# Patient Record
Sex: MALE | Race: BLACK OR AFRICAN AMERICAN | NOT HISPANIC OR LATINO | Employment: FULL TIME | URBAN - METROPOLITAN AREA
[De-identification: names, ages, dates, MRNs, and addresses within clinical notes are randomized per-mention and may not be internally consistent; named-entity substitution may affect disease eponyms.]

---

## 2015-06-19 LAB — HIV 1+O+2 AB SERPL QL: NORMAL

## 2018-08-02 ENCOUNTER — TELEPHONE (OUTPATIENT)
Dept: ENDOSCOPY | Facility: HOSPITAL | Age: 53
End: 2018-08-02

## 2018-08-03 DIAGNOSIS — Z12.11 SPECIAL SCREENING FOR MALIGNANT NEOPLASMS, COLON: Primary | ICD-10-CM

## 2018-08-08 DIAGNOSIS — Z12.11 SPECIAL SCREENING FOR MALIGNANT NEOPLASMS, COLON: Primary | ICD-10-CM

## 2018-08-16 ENCOUNTER — OFFICE VISIT (OUTPATIENT)
Dept: PODIATRY | Facility: CLINIC | Age: 53
End: 2018-08-16
Payer: MEDICARE

## 2018-08-16 VITALS
HEIGHT: 71 IN | HEART RATE: 66 BPM | BODY MASS INDEX: 21 KG/M2 | DIASTOLIC BLOOD PRESSURE: 87 MMHG | WEIGHT: 150 LBS | SYSTOLIC BLOOD PRESSURE: 147 MMHG

## 2018-08-16 DIAGNOSIS — L84 CORN OR CALLUS: ICD-10-CM

## 2018-08-16 DIAGNOSIS — B35.1 ONYCHOMYCOSIS DUE TO DERMATOPHYTE: ICD-10-CM

## 2018-08-16 DIAGNOSIS — E11.49 TYPE II DIABETES MELLITUS WITH NEUROLOGICAL MANIFESTATIONS: Primary | ICD-10-CM

## 2018-08-16 DIAGNOSIS — M20.42 HAMMER TOES OF BOTH FEET: ICD-10-CM

## 2018-08-16 DIAGNOSIS — M20.41 HAMMER TOES OF BOTH FEET: ICD-10-CM

## 2018-08-16 PROCEDURE — 99203 OFFICE O/P NEW LOW 30 MIN: CPT | Mod: 25,S$PBB,, | Performed by: PODIATRIST

## 2018-08-16 PROCEDURE — 99213 OFFICE O/P EST LOW 20 MIN: CPT | Mod: PBBFAC | Performed by: PODIATRIST

## 2018-08-16 PROCEDURE — 99999 PR PBB SHADOW E&M-EST. PATIENT-LVL III: CPT | Mod: PBBFAC,,, | Performed by: PODIATRIST

## 2018-08-16 PROCEDURE — 11721 DEBRIDE NAIL 6 OR MORE: CPT | Mod: 59,Q9,S$PBB, | Performed by: PODIATRIST

## 2018-08-16 PROCEDURE — 11721 DEBRIDE NAIL 6 OR MORE: CPT | Mod: Q9,PBBFAC | Performed by: PODIATRIST

## 2018-08-16 PROCEDURE — 11057 PARNG/CUTG B9 HYPRKR LES >4: CPT | Mod: Q9,59,PBBFAC | Performed by: PODIATRIST

## 2018-08-16 PROCEDURE — 11057 PARNG/CUTG B9 HYPRKR LES >4: CPT | Mod: Q9,S$PBB,, | Performed by: PODIATRIST

## 2018-08-16 RX ORDER — CICLOPIROX 80 MG/ML
SOLUTION TOPICAL NIGHTLY
Qty: 6.6 ML | Refills: 11 | Status: SHIPPED | OUTPATIENT
Start: 2018-08-16

## 2018-08-16 RX ORDER — AMMONIUM LACTATE 12 G/100G
CREAM TOPICAL
Qty: 140 G | Refills: 11 | Status: SHIPPED | OUTPATIENT
Start: 2018-08-16

## 2018-08-16 NOTE — PROGRESS NOTES
Subjective:      Patient ID: Salty Goode is a 53 y.o. male.    Chief Complaint: Diabetes Mellitus (dr cait shannon /07/05/2018) and Diabetic Foot Exam    Diabetes, increased risk amputation needing evaluation/management/optomization of foot care.  CC thick hard discolored callus and toenails both feet.  Gradual onset, worsening over past several weeks, aggravated by increased weight bearing, shoe gear, pressure.  No previous medical treatment.  OTC pain med not helping. Denies trauma, surgeyr both feet.  Wears tennis shoes both feet.    Chief Complaint   Patient presents with    Diabetes Mellitus     dr cait shannon /07/05/2018    Diabetic Foot Exam        Review of Systems   Constitution: Negative for chills, diaphoresis, fever, malaise/fatigue and night sweats.   Cardiovascular: Negative for claudication, cyanosis, leg swelling and syncope.   Skin: Positive for nail changes and suspicious lesions. Negative for color change, dry skin, rash and unusual hair distribution.   Musculoskeletal: Negative for falls, joint pain, joint swelling, muscle cramps, muscle weakness and stiffness.   Gastrointestinal: Negative for constipation, diarrhea, nausea and vomiting.   Neurological: Positive for paresthesias and sensory change. Negative for brief paralysis, disturbances in coordination, focal weakness, numbness and tremors.           Objective:      Physical Exam   Constitutional: He is oriented to person, place, and time. He appears well-developed and well-nourished. He is cooperative. No distress.   Cardiovascular:   Pulses:       Popliteal pulses are 2+ on the right side, and 2+ on the left side.        Dorsalis pedis pulses are 2+ on the right side, and 2+ on the left side.        Posterior tibial pulses are 2+ on the right side, and 2+ on the left side.   Capillary refill 3 seconds all toes/distal feet, all toes/both feet warm to touch.      Negative lymphadenopathy bilateral popliteal fossa and tarsal  tunnel.      Negavie lower extremity edema bilateral.     Musculoskeletal:        Right ankle: He exhibits normal range of motion, no swelling, no ecchymosis, no deformity, no laceration and normal pulse. Achilles tendon normal. Achilles tendon exhibits no pain, no defect and normal Villar's test results.   Patient has hammertoes of digits  2-5 bilateral                 partially reducible without symptom today.    Otherwise, Normal angle, base, station of gait. All ten toes without clubbing, cyanosis, or signs of ischemia.  No pain to palpation bilateral lower extremities.  Range of motion, stability, muscle strength, and muscle tone normal bilateral feet and legs.     Lymphadenopathy: No inguinal adenopathy noted on the right or left side.   Negative lymphadenopathy bilateral popliteal fossa and tarsal tunnel.    Negative lymphangitic streaking bilateral feet/ankles/legs.   Neurological: He is alert and oriented to person, place, and time. He has normal strength. He displays no atrophy and no tremor. A sensory deficit is present. He exhibits normal muscle tone. Gait normal.   Reflex Scores:       Patellar reflexes are 2+ on the right side and 2+ on the left side.       Achilles reflexes are 2+ on the right side and 2+ on the left side.  Negative tinel sign to percussion sural, superficial peroneal, deep peroneal, saphenous, and posterior tibial nerves right and left ankles and feet.    Diminished/loss of protective sensation all toes bilateral to 10 gram monofilament.      Paresthesias, and burning bilateral feet with no clearly identified trigger or source.     Skin: Skin is warm, dry and intact. Capillary refill takes 2 to 3 seconds. No abrasion, no bruising, no burn, no ecchymosis, no laceration, no lesion and no rash noted. He is not diaphoretic. No cyanosis or erythema. No pallor. Nails show no clubbing.   Focal hyperkeratotic lesion consisting entirely of hyperkeratotic tissue without open skin, drainage,  pus, fluctuance, malodor, or signs of infection plantar central heel bilateral, plantar mtpj 1 left, plantar mtpj 2,5 right (total 5)    Otherwise, Skin is normal age and health appropriate color, turgor, texture, and temperature bilateral lower extremities without ulceration, hyperpigmentation, discoloration, masses nodules or cords palpated.  No ecchymosis, erythema, edema, or cardinal signs of infection bilateral lower extremities.    Toenails 1st, 2nd, 3rd, 4th, 5th  bilateral are hypertrophic thickened 2-3 mm, dystrophic, discolored tanish brown with tan, gray crumbly subungual debris.  Tender to distal nail plate pressure, without periungual skin abnormality of each.     Psychiatric: He has a normal mood and affect.             Assessment:       Encounter Diagnoses   Name Primary?    Type II diabetes mellitus with neurological manifestations Yes    Corn or callus     Onychomycosis due to dermatophyte     Hammer toes of both feet          Plan:       Salty was seen today for diabetes mellitus and diabetic foot exam.    Diagnoses and all orders for this visit:    Type II diabetes mellitus with neurological manifestations  -     DIABETIC SHOES FOR HOME USE    Corn or callus  -     DIABETIC SHOES FOR HOME USE    Onychomycosis due to dermatophyte  -     DIABETIC SHOES FOR HOME USE    Hammer toes of both feet    Other orders  -     ciclopirox (PENLAC) 8 % Soln; Apply topically nightly.  -     ammonium lactate 12 % Crea; Apply twice daily to affected parts both feet as needed.      I counseled the patient on his conditions, their implications and medical management.        - Shoe inspection. Diabetic Foot Education. Patient reminded of the importance of good nutrition and blood sugar control to help prevent podiatric complications of diabetes. Patient instructed on proper foot hygeine. We discussed wearing proper shoe gear, daily foot inspections, never walking without protective shoe gear, never putting sharp  instruments to feet, routine podiatric visits at least annually months.      - With patient's permission, nails were aggressively reduced and debrided x 10 to their soft tissue attachment mechanically and with electric , removing all offending nail and debris. Patient relates relief following the procedure. He will continue to monitor the areas daily, inspect his feet, wear protective shoe gear when ambulatory, moisturizer to maintain skin integrity and follow in this office  P.r.n.    With the patient's permission, I debrided hyperkeratotic lesion(s) as above totaling       5         to, not  Including dermis with sterile #15 blade.  Patient tolerated the procedure well and related significant relief.        Rx DM shoes, inserts, lac hydrin, penlac    Discussed conservative treatment with shoes of adequate dimensions, material, and style to alleviate symptoms and delay or prevent surgical intervention.           Follow-up in about 1 year (around 8/16/2019).

## 2018-08-16 NOTE — LETTER
August 16, 2018      Francisco Bradley MD  [address] [facility] [place] [address]           [facility]  [address]  [place] [address]  Phone: [phone]          Patient: Salty Goode   MR Number: [id number]   YOB: 1965   Date of Visit: 8/16/2018       Dear Dr. Francisco Bradley:    Thank you for referring Salty Goode to me for evaluation. Attached you will find relevant portions of my assessment and plan of care.    If you have questions, please do not hesitate to call me. I look forward to following Salty Goode along with you.    Sincerely,    Naveed Doe, JAXON    Enclosure  CC:  No Recipients    If you would like to receive this communication electronically, please contact [email or web address] or [phone] to request more information on [employer] Link access.    For providers and/or their staff who would like to refer a patient to [facility], please contact us through our one-stop-shop provider referral line, [facility], at [phone].    If you feel you have received this communication in error or would no longer like to receive these types of communications, please e-mail [email or web address]

## 2018-08-21 DIAGNOSIS — Z12.11 SPECIAL SCREENING FOR MALIGNANT NEOPLASMS, COLON: Primary | ICD-10-CM

## 2018-08-21 RX ORDER — POLYETHYLENE GLYCOL 3350, SODIUM SULFATE ANHYDROUS, SODIUM BICARBONATE, SODIUM CHLORIDE, POTASSIUM CHLORIDE 236; 22.74; 6.74; 5.86; 2.97 G/4L; G/4L; G/4L; G/4L; G/4L
4 POWDER, FOR SOLUTION ORAL ONCE
Qty: 4000 ML | Refills: 0 | Status: SHIPPED | OUTPATIENT
Start: 2018-08-21 | End: 2018-08-21

## 2018-08-29 ENCOUNTER — TELEPHONE (OUTPATIENT)
Dept: ENDOSCOPY | Facility: HOSPITAL | Age: 53
End: 2018-08-29

## 2018-08-29 DIAGNOSIS — Z12.11 SCREEN FOR COLON CANCER: Primary | ICD-10-CM

## 2018-11-06 ENCOUNTER — TELEPHONE (OUTPATIENT)
Dept: ENDOSCOPY | Facility: HOSPITAL | Age: 53
End: 2018-11-06

## 2018-11-12 ENCOUNTER — TELEPHONE (OUTPATIENT)
Dept: ENDOSCOPY | Facility: HOSPITAL | Age: 53
End: 2018-11-12

## 2019-01-09 DIAGNOSIS — I69.354 HEMIPLEGIA AND HEMIPARESIS FOLLOWING CEREBRAL INFARCTION AFFECTING LEFT NON-DOMINANT SIDE: Primary | ICD-10-CM

## 2019-01-18 ENCOUNTER — TELEPHONE (OUTPATIENT)
Dept: PODIATRY | Facility: CLINIC | Age: 54
End: 2019-01-18

## 2025-03-12 LAB
CREAT UR-MCNC: 46.9 MG/DL
MICROALBUMIN URINE: 58.3
MICROALBUMIN/CREATININE RATIO: 124 UG/MG

## 2025-03-26 ENCOUNTER — TELEPHONE (OUTPATIENT)
Dept: NEUROLOGY | Facility: CLINIC | Age: 60
End: 2025-03-26
Payer: MEDICARE

## 2025-03-26 NOTE — TELEPHONE ENCOUNTER
----- Message from Mak sent at 3/25/2025  9:42 AM CDT -----  Regarding: Callback  Contact: [phone]  Patient calling requesting a callback from nurse or provider in regards to speaking about medications. Please call back as soon as possible.

## 2025-04-11 ENCOUNTER — TELEPHONE (OUTPATIENT)
Dept: NEUROLOGY | Facility: CLINIC | Age: 60
End: 2025-04-11
Payer: MEDICARE

## 2025-04-11 DIAGNOSIS — G89.29 OTHER CHRONIC PAIN: Primary | ICD-10-CM

## 2025-04-11 RX ORDER — TRAMADOL HYDROCHLORIDE 50 MG/1
100 TABLET ORAL EVERY 6 HOURS
COMMUNITY
End: 2025-04-11 | Stop reason: SDUPTHER

## 2025-04-11 RX ORDER — DIAZEPAM 5 MG/1
5 TABLET ORAL 3 TIMES DAILY PRN
COMMUNITY
End: 2025-04-11 | Stop reason: SDUPTHER

## 2025-04-11 NOTE — TELEPHONE ENCOUNTER
----- Message from Teressa sent at 4/11/2025 12:51 PM CDT -----  Regarding: missed call  Type:  Patient Returning CallWho Called:patientWho Left Message for Patient:Maday the patient know what this is regarding?:yesWould the patient rather a call back or a response via [facility]? [facility] Call Back Number: [phone] Information:

## 2025-04-14 ENCOUNTER — TELEPHONE (OUTPATIENT)
Dept: NEUROLOGY | Facility: CLINIC | Age: 60
End: 2025-04-14
Payer: MEDICARE

## 2025-04-14 RX ORDER — DIAZEPAM 5 MG/1
5 TABLET ORAL 3 TIMES DAILY PRN
Qty: 90 TABLET | Refills: 1 | Status: SHIPPED | OUTPATIENT
Start: 2025-04-14

## 2025-04-14 RX ORDER — TRAMADOL HYDROCHLORIDE 50 MG/1
100 TABLET ORAL EVERY 6 HOURS PRN
Qty: 240 TABLET | Refills: 1 | Status: SHIPPED | OUTPATIENT
Start: 2025-04-14

## 2025-04-14 NOTE — TELEPHONE ENCOUNTER
----- Message from Khadijah sent at 4/14/2025  8:26 AM CDT -----    Patient Returning CallWho Called pt Does the patient know what this is regarding?:need all medications filled pt need his medicationWould the patient rather a call back or a response via [facility]? [facility] Call Back Number:[phone] Information: call back has soonest appt that's in June

## 2025-04-16 ENCOUNTER — OFFICE VISIT (OUTPATIENT)
Dept: PRIMARY CARE CLINIC | Facility: CLINIC | Age: 60
End: 2025-04-16
Payer: MEDICARE

## 2025-04-16 VITALS
HEART RATE: 84 BPM | HEIGHT: 71 IN | DIASTOLIC BLOOD PRESSURE: 64 MMHG | SYSTOLIC BLOOD PRESSURE: 112 MMHG | OXYGEN SATURATION: 97 % | WEIGHT: 165.13 LBS | BODY MASS INDEX: 23.12 KG/M2

## 2025-04-16 DIAGNOSIS — G81.94 LEFT HEMIPARESIS: ICD-10-CM

## 2025-04-16 DIAGNOSIS — E11.9 TYPE 2 DIABETES MELLITUS WITHOUT COMPLICATION, WITHOUT LONG-TERM CURRENT USE OF INSULIN: ICD-10-CM

## 2025-04-16 DIAGNOSIS — E78.00 HYPERCHOLESTEROLEMIA: Primary | ICD-10-CM

## 2025-04-16 DIAGNOSIS — Z00.00 ROUTINE MEDICAL EXAM: ICD-10-CM

## 2025-04-16 PROCEDURE — 99999 PR PBB SHADOW E&M-EST. PATIENT-LVL IV: CPT | Mod: PBBFAC,,, | Performed by: NURSE PRACTITIONER

## 2025-04-16 RX ORDER — LOSARTAN POTASSIUM AND HYDROCHLOROTHIAZIDE 25; 100 MG/1; MG/1
1 TABLET ORAL DAILY
COMMUNITY
Start: 2025-03-12

## 2025-04-16 RX ORDER — CHOLECALCIFEROL (VITAMIN D3) 25 MCG
1 TABLET ORAL DAILY
COMMUNITY
Start: 2025-03-12

## 2025-04-16 RX ORDER — DULAGLUTIDE 1.5 MG/.5ML
1.5 INJECTION, SOLUTION SUBCUTANEOUS
COMMUNITY
Start: 2025-03-18

## 2025-04-16 NOTE — PROGRESS NOTES
[facility] Note    Chief Complaint      Chief Complaint   Patient presents with    Establish Care    Referral     Neurology    diabetic shoes     order       History of Present Illness    Patient is new to me.  He presents to clinic today to establish primary care with me.  Reports feeling well today.  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  He is being followed by Neurology.  There are no complaints today.    Salty Goode is a 59 y.o. male who presents today for   Chief Complaint   Patient presents with    [facility]    Referral     Neurology    diabetic shoes     order     Vitals: Normal blood pressure.  General: No acute distress. Well-developed. Well-nourished.  Eyes: EOMI. Sclerae anicteric.  HENT: Normocephalic. Atraumatic. Nares patent. Moist oral mucosa.  Cardiovascular: Regular rate. Regular rhythm. No murmurs. No rubs. No gallops. Normal S1, S2. Normal heart sounds.  Respiratory: Normal respiratory effort. Clear to auscultation bilaterally. No rales. No rhonchi. No wheezing. Normal lung sounds.  Musculoskeletal: No  obvious deformity.  Extremities: No lower extremity edema.  Neurological: Alert & oriented x3. No slurred speech. Normal gait.  Psychiatric: Normal mood. Normal affect. Good insight. Good judgment.  Skin: Warm. Dry. No rash.      Family History:  family history is not on file.   Family history was reviewed with patient.     Medications:  Encounter Medications[1]    Allergies:  Review of patient's allergies indicates:  No Known Allergies    Health Maintenance:  Health Maintenance   Topic Date Due    Hepatitis C Screening  Never done    HIV Screening  Never done    Shingles Vaccine (1 of 2) Never done    TETANUS VACCINE  04/16/2026 (Originally 8/5/1984)    High Dose Statin  04/16/2026    Colorectal Cancer Screening  08/29/2028    Lipid Panel  01/30/2029    RSV Vaccine (Age 60+ and Pregnant patients) (1  "- 1-dose 75+ series) 08/05/2041    Influenza Vaccine  Completed    COVID-19 Vaccine  Completed    Pneumococcal Vaccines (Age 50+)  Completed     Health Maintenance Topics with due status: Not Due       Topic Last Completion Date    Colorectal Cancer Screening 08/29/2018    Lipid Panel 01/30/2024    High Dose Statin 04/16/2025    RSV Vaccine (Age 60+ and Pregnant patients) Not Due       Physical Exam      Vital Signs  Pulse: 84  SpO2: 97 %  BP: 112/64  BP Location: Left arm  Patient Position: Sitting  Pain Score:   9  Height and Weight  Height: 5' 10.8" (179.8 cm)  Weight: 74.9 kg (165 lb 2 oz)  BSA (Calculated - sq m): 1.93 sq meters  BMI (Calculated): 23.2  Weight in (lb) to have BMI = 25: 177.9]    Physical Exam  Vitals reviewed.   Constitutional:       Appearance: Normal appearance. He is normal weight.   HENT:      Head: Normocephalic and atraumatic.      Right Ear: Tympanic membrane, ear canal and external ear normal.      Left Ear: Tympanic membrane, ear canal and external ear normal.      Nose: Nose normal.      Mouth/Throat:      Mouth: Mucous membranes are moist.      Pharynx: Oropharynx is clear.   Eyes:      Extraocular Movements: Extraocular movements intact.      Conjunctiva/sclera: Conjunctivae normal.      Pupils: Pupils are equal, round, and reactive to light.   Cardiovascular:      Rate and Rhythm: Normal rate and regular rhythm.      Pulses: Normal pulses.      Heart sounds: Normal heart sounds.   Pulmonary:      Effort: Pulmonary effort is normal.      Breath sounds: Normal breath sounds.   Abdominal:      General: Abdomen is flat. Bowel sounds are normal.      Palpations: Abdomen is soft.   Musculoskeletal:         General: Normal range of motion.      Cervical back: Normal range of motion and neck supple.      Comments: + unable to move LUE freely.   Skin:     General: Skin is warm and dry.      Capillary Refill: Capillary refill takes less than 2 seconds.   Neurological:      Mental Status: He " is alert and oriented to person, place, and time. Mental status is at baseline.      Gait: Gait abnormal.   Psychiatric:         Mood and Affect: Mood normal.         Behavior: Behavior normal.         Thought Content: Thought content normal.         Judgment: Judgment normal.            Assessment/Plan     Salty Goode is a 59 y.o.male with:    ROUTINE MEDICAL EXAM:  - CBC, CMP, Hgb A1c, thyroid levels reviewed with patient.  - physical assessment completed.    Hypercholesterolemia  -     Lipid Panel; Future; Expected date: 04/16/2025    Left hemiparesis  - patient is being followed by neurology.    Type 2 diabetes mellitus without complication, without long-term current use of insulin  -     Ambulatory referral/consult to Podiatry; Future; Expected date: 04/23/2025  - Referring patient to GLORIA Esparza for diabetes management.        As above, continue current medications and maintain follow up with specialists.  Return to clinic as needed.    Greater than 50% of visit was spent face to face with patient.  All questions were answered to patient's satisfaction.          Karen L Spencer, NP-C [facility]                     [1]   Outpatient Encounter Medications as of 4/16/2025   Medication Sig Dispense Refill    amlodipine (NORVASC) 10 MG tablet Take 1 tablet (10 mg total) by mouth once daily. 30 tablet 4    aspirin (ECOTRIN) 325 MG EC tablet Take 1 tablet (325 mg total) by mouth once daily.  0    atorvastatin (LIPITOR) 40 MG tablet Take 1 tablet (40 mg total) by mouth once daily. 30 tablet 4    clopidogrel (PLAVIX) 75 mg tablet Take 1 tablet (75 mg total) by mouth once daily. 30 tablet 4    diazePAM (VALIUM) 5 MG tablet Take 1 tablet (5 mg total) by mouth 3 (three) times daily as needed for Anxiety. 90 tablet 1    empagliflozin (JARDIANCE) 25 mg tablet Take 1 tablet by mouth once daily.      losartan-hydrochlorothiazide 100-25 mg (HYZAAR) 100-25 mg per tablet Take 1 tablet by mouth once daily.       traMADoL (ULTRAM) 50 mg tablet Take 2 tablets (100 mg total) by mouth every 6 (six) hours as needed for Pain. 240 tablet 1    TRULICITY 1.5 mg/0.5 mL pen injector Inject 1.5 mg into the skin every 7 days.      vitamin D (VITAMIN D3) 1000 units Tab Take 1 tablet by mouth once daily.      [DISCONTINUED] ammonium lactate 12 % Crea Apply twice daily to affected parts both feet as needed. (Patient not taking: Reported on 4/16/2025) 140 g 11    [DISCONTINUED] ciclopirox (PENLAC) 8 % Soln Apply topically nightly. (Patient not taking: Reported on 4/16/2025) 6.6 mL 11    [DISCONTINUED] diazePAM (VALIUM) 5 MG tablet Take 5 mg by mouth 3 (three) times daily as needed for Anxiety.      [DISCONTINUED] lisinopril (PRINIVIL,ZESTRIL) 40 MG tablet Take 1 tablet (40 mg total) by mouth once daily. (Patient not taking: Reported on 4/16/2025) 30 tablet 4    [DISCONTINUED] metformin (GLUCOPHAGE) 500 MG tablet Take 500 mg by mouth 2 (two) times daily with meals. (Patient not taking: Reported on 4/16/2025)      [DISCONTINUED] traMADoL (ULTRAM) 50 mg tablet Take 100 mg by mouth every 6 (six) hours.       No facility-administered encounter medications on file as of 4/16/2025.

## 2025-04-17 ENCOUNTER — TELEPHONE (OUTPATIENT)
Dept: INTERNAL MEDICINE | Facility: CLINIC | Age: 60
End: 2025-04-17
Payer: MEDICARE

## 2025-04-17 NOTE — TELEPHONE ENCOUNTER
Attempted to call patient for scheduling with diabetic provider. Patient didn't answer, left message to call office back for scheduling.

## 2025-04-17 NOTE — TELEPHONE ENCOUNTER
Patient called for scheduling. Patient scheduled soonest available, patient voiced understanding of appointment time and date.

## 2025-04-21 ENCOUNTER — LAB VISIT (OUTPATIENT)
Dept: LAB | Facility: HOSPITAL | Age: 60
End: 2025-04-21
Attending: NURSE PRACTITIONER
Payer: MEDICARE

## 2025-04-21 DIAGNOSIS — E78.00 HYPERCHOLESTEROLEMIA: ICD-10-CM

## 2025-04-21 LAB
CHOLEST SERPL-MCNC: 148 MG/DL (ref 120–199)
CHOLEST/HDLC SERPL: 5.3 {RATIO} (ref 2–5)
HDLC SERPL-MCNC: 28 MG/DL (ref 40–75)
HDLC SERPL: 18.9 % (ref 20–50)
LDLC SERPL CALC-MCNC: 105.8 MG/DL (ref 63–159)
NONHDLC SERPL-MCNC: 120 MG/DL
TRIGL SERPL-MCNC: 71 MG/DL (ref 30–150)

## 2025-04-21 PROCEDURE — 80061 LIPID PANEL: CPT

## 2025-04-21 PROCEDURE — 36415 COLL VENOUS BLD VENIPUNCTURE: CPT | Mod: PN

## 2025-04-23 DIAGNOSIS — E11.9 TYPE 2 DIABETES MELLITUS WITHOUT COMPLICATION, WITHOUT LONG-TERM CURRENT USE OF INSULIN: Primary | ICD-10-CM

## 2025-04-23 NOTE — PROGRESS NOTES
Review cholesterol level with patient via telephone.  Patient needs diabetic shoes and can not see podiatrist for another 3 months.  I have placed order for diabetic shoes.  Patient will continue follow up with Podiatry in July.  He will  prescription from our office.

## 2025-05-09 LAB — CRC RECOMMENDATION EXT: NORMAL

## 2025-05-13 ENCOUNTER — OFFICE VISIT (OUTPATIENT)
Dept: INTERNAL MEDICINE | Facility: CLINIC | Age: 60
End: 2025-05-13
Payer: MEDICARE

## 2025-05-13 VITALS
WEIGHT: 164.25 LBS | SYSTOLIC BLOOD PRESSURE: 120 MMHG | BODY MASS INDEX: 23.04 KG/M2 | DIASTOLIC BLOOD PRESSURE: 64 MMHG | OXYGEN SATURATION: 98 % | HEART RATE: 86 BPM

## 2025-05-13 DIAGNOSIS — E11.9 TYPE 2 DIABETES MELLITUS WITHOUT COMPLICATION, WITHOUT LONG-TERM CURRENT USE OF INSULIN: Primary | ICD-10-CM

## 2025-05-13 DIAGNOSIS — I63.9 ACUTE CVA (CEREBROVASCULAR ACCIDENT): ICD-10-CM

## 2025-05-13 DIAGNOSIS — E78.5 HYPERLIPIDEMIA LDL GOAL <70: ICD-10-CM

## 2025-05-13 DIAGNOSIS — I10 BENIGN HYPERTENSION: ICD-10-CM

## 2025-05-13 LAB — GLUCOSE SERPL-MCNC: 160 MG/DL (ref 70–110)

## 2025-05-13 PROCEDURE — 99999 PR PBB SHADOW E&M-EST. PATIENT-LVL III: CPT | Mod: PBBFAC,,,

## 2025-05-13 RX ORDER — INSULIN ASPART 100 [IU]/ML
INJECTION, SOLUTION INTRAVENOUS; SUBCUTANEOUS
Qty: 15 ML | Refills: 3 | Status: SHIPPED | OUTPATIENT
Start: 2025-05-13

## 2025-05-13 RX ORDER — AMLODIPINE BESYLATE 10 MG/1
10 TABLET ORAL DAILY
Qty: 30 TABLET | Refills: 4 | Status: SHIPPED | OUTPATIENT
Start: 2025-05-13 | End: 2025-05-13

## 2025-05-13 RX ORDER — AMLODIPINE BESYLATE 10 MG/1
10 TABLET ORAL DAILY
Qty: 30 TABLET | Refills: 4 | Status: SHIPPED | OUTPATIENT
Start: 2025-05-13 | End: 2026-05-13

## 2025-05-13 RX ORDER — CLOPIDOGREL BISULFATE 75 MG/1
75 TABLET ORAL DAILY
Qty: 30 TABLET | Refills: 4 | Status: SHIPPED | OUTPATIENT
Start: 2025-05-13 | End: 2025-05-13

## 2025-05-13 RX ORDER — ATORVASTATIN CALCIUM 40 MG/1
40 TABLET, FILM COATED ORAL DAILY
Qty: 30 TABLET | Refills: 4 | Status: SHIPPED | OUTPATIENT
Start: 2025-05-13 | End: 2025-05-13

## 2025-05-13 RX ORDER — BACLOFEN 10 MG/1
30 TABLET ORAL 3 TIMES DAILY
COMMUNITY
Start: 2025-01-02

## 2025-05-13 RX ORDER — FERROUS SULFATE 325(65) MG
TABLET ORAL
COMMUNITY

## 2025-05-13 RX ORDER — ATORVASTATIN CALCIUM 40 MG/1
40 TABLET, FILM COATED ORAL DAILY
Qty: 30 TABLET | Refills: 4 | Status: SHIPPED | OUTPATIENT
Start: 2025-05-13 | End: 2026-05-13

## 2025-05-13 RX ORDER — INSULIN ASPART 100 [IU]/ML
INJECTION, SOLUTION INTRAVENOUS; SUBCUTANEOUS
Qty: 3 ML | Refills: 3 | Status: SHIPPED | OUTPATIENT
Start: 2025-05-13 | End: 2025-05-13

## 2025-05-13 RX ORDER — CLOPIDOGREL BISULFATE 75 MG/1
75 TABLET ORAL DAILY
Qty: 30 TABLET | Refills: 4 | Status: SHIPPED | OUTPATIENT
Start: 2025-05-13 | End: 2026-05-13

## 2025-05-13 NOTE — ASSESSMENT & PLAN NOTE
-Reviewed Lipid panel 148/71/28/105  -Discussed LDL goal <70.  -Discussed lifestyle changes.  -Advised to start back taking his atorvastatin 40 mg at bedtime.  -Discussed importance of lipid control in setting of diabetes  -Discussed the importance of taking statin every night and lifestyle changes to slow the risk of a cardiovascular event such as CVA or MI.

## 2025-05-13 NOTE — ASSESSMENT & PLAN NOTE
-Currently not taking Plavix   -Discussed importance of taking medications as prescribed to prevent another cardiovascular incident.  -Refilled Plavix

## 2025-05-13 NOTE — ASSESSMENT & PLAN NOTE
-Reviewed /64  -Discussed BP goal 130/80 to prevent a cardiovascular event such as CVA or MI.  -currently not taking amlodipine, prescription .  -Discussed the importance of taking medications as prescribed to prevent another cardiovascular incident  -Advised to start back taking amlodipine 10 mg daily  -Advised to monitor BP at home.  -Amlodipine refilled.

## 2025-05-13 NOTE — ASSESSMENT & PLAN NOTE
-Reviewed last A1c.  8.4%  -Discussed A1c goal <7%.  -Discussed BG goals, provided copy.  -Start Dexcom G7 CGM monitoring.  -Dexcom prescription sent to [facility].  -Discussed importance of making lifestyle changes.  -Provided brochures / hand out on meal planning.  -Advised to start an exercise regimen.   -Cont.  Jardiance 25 mg daily.  -Increase Trulicity to 3 mg weekly  -Start NovoLog sliding scale insulin with meals    .0-150= 0  151-200 = 2 units  201-250= 4 units  251-300= 6 units  301-350= 8 units  351 or greater = 10 units.   -Discussed how to treat hypoglycemia if experienced.  -Discussed goal to wean insulin to a minimal dose w/o experiencing hypo or hyperglycemia.

## 2025-05-13 NOTE — PATIENT INSTRUCTIONS
Start monitoring your blood sugars when you get your Dexcom G7.       Blood glucose goals:   Fasting blood glucose goal:  mg/dL.  Premeal blood glucose goal:  mg/dL.  Postmeal blood glucose goal ( 2 hrs): <180 mg/dL.  Bedtime / overnight blood glucose goal:  mg/dL.     Continue taking Jardiance 25mg daily.      Increase Trulicity to 3mg weekly.      Start Novolog sliding scale with meals as needed.       Start taking your Plavix and Amlodipine in the morning.      Start taking your Atorvastatin every night.      If your blood sugar is low, follow the 15-15 rule: Have 15 grams of carbs, then wait 15 minutes. If the blood glucose level is less than 70, treat with 15 g of fast-acting carbohydrate, such as 4 oz of fruit juice or three or four glucose tablets; and  recheck the blood glucose level after 15 minutes to ensure that it has normalized.  Check your blood sugar again. If it's still less than 70 mg/dL, repeat this process.        Refer to the food brochures when planning meals.      Start exercising for 30 minutes at least 3 times / week as tolerates. Be sure to mix cardio with strength training.       Schedule your diabetes eye exam.      Follow up on  6/24/2025 @ 10:30 am.

## 2025-05-13 NOTE — PROGRESS NOTES
CHIEF COMPLAINT: Type 2 Diabetes    Referred by PCP: Olive Nieves  Previously managed by: PCP  CDE person of contact (if needed): Rima Nicole       HPI: Mr. Salty Goode is a 59 y.o. male who was diagnosed with Type 2 DM in +/- 20 yrs.     Last A1c: 8.4%    Hx of hospitalization for DM?  No  Hx of HTN?  Yes  Hx of CKD?  No  Hx of CHF?  No  Hx of CVA? Yes,   Hx of MI?  No  Hx of Pancreatitis?  No  Hx of DM neuropathy?  No  Hx DR?  No  Hx of Gastroparesis?  No      VISIT SUMMARY:  Has HX CVA  with left hemiparesis  Reports originally Dx with T2DM during childhood, but started treatment after his CVA 10 yrs ago.  Labs reviewed: A1c 8.4%; Lipid 148/71/28/105; GFR 87  Reports needing more diabetic shoes  Attends PT 3xs weekly  Reports not able to check his blood sugars due to limit ROM  to left hand.   POCT collected 160  Reports his Trulicity increased to 3mg, but local pharmacy is out of supply.  RX Trulicity 3mg sent to [facility]   Discussed starting sliding scale insulin      Sliding scale insulin:   0-150= 0  151-200 = 2 units  201-250= 4 units  251-300= 6 units  301-350= 8 units  351 or greater = 10 units.   Discussed starting Dexcom G7 CGM  RX for amlodipine, atorvastatin, plavix, .   Discussed with patient the importance of taking medications as prescribed, he verbalized understanding.  Refills sent to [facility].  Rx for Dexcom G7 kit sent to [facility]      BG monitoring:   Name: None  How often: N/A  BG range: N/A        Lifestyle:   Diet: 2-3 meals daily  Exercise: no regular exercise; attends PT 3xs week        Previous Diabetes Meds Tried:  Metformin- GI upset   Glimepiride- diabetes better controlled          Current Diabetic Meds:   Trulicity 1.5mg weekly  Jardiance 25mg daily          Diabetes Management Status:  Statin: Not taking  ACE/ARB: Taking    Foot exam due:  Eye exam due:      Screening or Prevention Patient's value Goal Complete/Controlled?   HgA1C  "Testing and Control   Lab Results   Component Value Date    HGBA1C 8.4 (H) 03/12/2025      Annually/Less than 8% No   Lipid profile : 04/21/2025 Annually Yes   LDL control Lab Results   Component Value Date    LDLCALC 105.8 04/21/2025    Annually/Less than 100 mg/dl  No   Nephropathy screening No results found for: "LABMICR"  No results found for: "PROTEINUA" Annually No   Blood pressure BP Readings from Last 1 Encounters:   05/13/25 120/64    Less than 140/90 Yes   Dilated retinal exam Most Recent Eye Exam Date: Not Found Annually Yes   Foot exam   Most Recent Foot Exam Date: Not Found Annually Yes     REVIEW OF SYSTEMS  General: Denies weakness, fatigue, or weight changes.   Eyes: Denies double or blurred vision, eye pain, or redness.  Cardiovascular: Denies CP, palpitations, or edema.   Respiratory: Denies cough or dyspnea.   GI: Denies heartburn, n/v, or changes in bowel patterns.   Skin: Denies rash, lesions, itching, or injection site problems.  Neuro: Denies severe HAs, numbness, tingling, tremors, or vertigo.   Endocrine: Denies polyuria, polydipsia, polyphagia, heat or cold intolerance.     Vital Signs  /64 (BP Location: Right arm, Patient Position: Sitting)   Pulse 86   Wt 74.5 kg (164 lb 3.9 oz)   SpO2 98%   BMI 23.04 kg/m²     Hemoglobin A1C   Date Value Ref Range Status   03/12/2025 8.4 (H) 4.7 - 5.6 % Final   04/08/2015 7.4 (H) 4.5 - 6.2 % Final        Chemistry        Component Value Date/Time     03/12/2025 1131     04/14/2015 0502    K 4.6 03/12/2025 1131    K 4.1 04/14/2015 0502     04/14/2015 0502    CO2 32 03/12/2025 1131    CO2 25 04/14/2015 0502    BUN 17.0 03/12/2025 1131    BUN 10 04/14/2015 0502    CREATININE 1.00 03/12/2025 1131    CREATININE 1.2 04/14/2015 0502     (H) 04/14/2015 0502        Component Value Date/Time    CALCIUM 9.8 03/12/2025 1131    CALCIUM 9.4 04/14/2015 0502    ALKPHOS 85 04/14/2015 0502    AST 16 03/12/2025 1131    AST 48 (H) " 04/14/2015 0502    ALT 20 03/12/2025 1131    ALT 77 (H) 04/14/2015 0502    BILITOT 0.6 03/12/2025 1131    BILITOT 0.7 04/14/2015 0502    ESTGFRAFRICA >60.0 04/14/2015 0502    EGFRNONAA >60.0 04/14/2015 0502           Lab Results   Component Value Date    TSH 0.855 04/08/2015      Lab Results   Component Value Date    CHOL 148 04/21/2025    CHOL 146 04/08/2015     Lab Results   Component Value Date    HDL 28 (L) 04/21/2025    HDL 37 (L) 04/08/2015     Lab Results   Component Value Date    LDLCALC 105.8 04/21/2025    LDLCALC 79.4 04/08/2015     Lab Results   Component Value Date    TRIG 71 04/21/2025    TRIG 148 04/08/2015     Lab Results   Component Value Date    CHOLHDL 18.9 (L) 04/21/2025    CHOLHDL 25.3 04/08/2015         PHYSICAL EXAMINATION  Constitutional: Appears well, no distress.  Eyes: Sclera white, PERRLA, EOMs intact.  Neck: Supple, trachea midline.   Respiratory: No cough, SOB, nor BAKER.  Cardiovascular: RRR; no edema.  Skin: Warm and dry; no rash, lesions, acanthosis nigricans, nor injection site reactions.  Neuro: AAOx4, steady gait  Psychiatric: No unusual, disruptive, or inappropriate behavior.       Assessment/Plan    I spent a total of 75 minutes on the day of the visit.This includes face to face time and non-face to face time preparing to see the patient (eg, review of tests), obtaining and/or reviewing separately obtained history, documenting clinical information in the electronic or other health record, independently interpreting results and communicating results to the patient/family/caregiver, or care coordinator.       1. Type 2 diabetes mellitus without complication, without long-term current use of insulin  Assessment & Plan:    -Reviewed last A1c.  8.4%  -Discussed A1c goal <7%.  -Discussed BG goals, provided copy.  -Start Dexcom G7 CGM monitoring.  -Dexcom prescription sent to [facility].  -Discussed importance of making lifestyle changes.  -Provided brochures / hand out on meal  planning.  -Advised to start an exercise regimen.   -Cont.  Jardiance 25 mg daily.  -Increase Trulicity to 3 mg weekly  -Start NovoLog sliding scale insulin with meals    .0-150= 0  151-200 = 2 units  201-250= 4 units  251-300= 6 units  301-350= 8 units  351 or greater = 10 units.   -Discussed how to treat hypoglycemia if experienced.  -Discussed goal to wean insulin to a minimal dose w/o experiencing hypo or hyperglycemia.      Orders:  -     POCT Glucose, Hand-Held Device  -     dulaglutide (TRULICITY) 3 mg/0.5 mL pen injector; Inject 3 mg into the skin every 7 days.  Dispense: 4 pen ; Refill: 3  -     Discontinue: insulin aspart U-100 (NOVOLOG FLEXPEN U-100 INSULIN) 100 unit/mL (3 mL) InPn pen; Sliding scale insulin:   0-150= 0  151-200 = 2 units  201-250= 4 units  251-300= 6 units  301-350= 8 units  351 or greater = 10 units.  Dispense: 3 mL; Refill: 3  -     insulin aspart U-100 (NOVOLOG FLEXPEN U-100 INSULIN) 100 unit/mL (3 mL) InPn pen; Sliding scale insulin: with meals up to 3 times daily.  slindg scale 0-150= 0; 151-200 = 2 units; 201-250= 4 units; 251-300= 6 units; 301-350= 8 units; 351 or greater = 10 units. Total daily dose 35 units.  Dispense: 15 mL; Refill: 3  -     empagliflozin (JARDIANCE) 25 mg tablet; Take 1 tablet (25 mg total) by mouth once daily.  Dispense: 90 tablet; Refill: 1    2. Benign hypertension  Assessment & Plan:  -Reviewed /64  -Discussed BP goal 130/80 to prevent a cardiovascular event such as CVA or MI.  -currently not taking amlodipine, prescription .  -Discussed the importance of taking medications as prescribed to prevent another cardiovascular incident  -Advised to start back taking amlodipine 10 mg daily  -Advised to monitor BP at home.  -Amlodipine refilled.       Orders:  -     Discontinue: amLODIPine (NORVASC) 10 MG tablet; Take 1 tablet (10 mg total) by mouth once daily.  Dispense: 30 tablet; Refill: 4  -     amLODIPine (NORVASC) 10 MG tablet; Take 1 tablet (10  mg total) by mouth once daily.  Dispense: 30 tablet; Refill: 4    3. Acute CVA (cerebrovascular accident)  Assessment & Plan:  -Currently not taking Plavix   -Discussed importance of taking medications as prescribed to prevent another cardiovascular incident.  -Refilled Plavix    Orders:  -     Discontinue: clopidogreL (PLAVIX) 75 mg tablet; Take 1 tablet (75 mg total) by mouth once daily.  Dispense: 30 tablet; Refill: 4  -     clopidogreL (PLAVIX) 75 mg tablet; Take 1 tablet (75 mg total) by mouth once daily.  Dispense: 30 tablet; Refill: 4    4. Hyperlipidemia LDL goal <70  Assessment & Plan:  -Reviewed Lipid panel 148/71/28/105  -Discussed LDL goal <70.  -Discussed lifestyle changes.  -Advised to start back taking his atorvastatin 40 mg at bedtime.  -Discussed importance of lipid control in setting of diabetes  -Discussed the importance of taking statin every night and lifestyle changes to slow the risk of a cardiovascular event such as CVA or MI.      Orders:  -     Discontinue: atorvastatin (LIPITOR) 40 MG tablet; Take 1 tablet (40 mg total) by mouth once daily.  Dispense: 30 tablet; Refill: 4  -     atorvastatin (LIPITOR) 40 MG tablet; Take 1 tablet (40 mg total) by mouth once daily.  Dispense: 30 tablet; Refill: 4      FOLLOW UP  Follow up in about 6 weeks (around 6/24/2025).

## 2025-05-21 ENCOUNTER — TELEPHONE (OUTPATIENT)
Dept: NEUROLOGY | Facility: CLINIC | Age: 60
End: 2025-05-21
Payer: MEDICARE

## 2025-05-21 NOTE — TELEPHONE ENCOUNTER
Staff spoke to patient and informed him we cannot refill his medication. He has to see Dr. Hernandez first to establish care at [facility]. Patient verbally understood. Staff moved up patient's appt for something sooner.

## 2025-05-21 NOTE — TELEPHONE ENCOUNTER
----- Message from Kylah sent at 5/21/2025 12:48 PM CDT -----  Regarding: Medication Refill  Contact: Patient  Type:  RX Refill RequestWho Called: Patient Refill or New Rx: Refill RX Name and Strength:traMADoL (ULTRAM) 50 mg tabletHow is the patient currently taking it? (ex. 1XDay):Take 2 tablets (100 mg total) by mouth every 6 (six) hours as needed for Pain. - OralRX Name and Strength:diazePAM (VALIUM) 5 MG tabletHow is the patient currently taking it? (ex. 1XDay):Take 1 tablet (5 mg total) by mouth 3 (three) times daily as needed for Anxiety. - OralIs this a 30 day or 90 day RX: 30 day Preferred Pharmacy with phone number:[facility][id number] [address] [facility] [address] Phone: [phone] or Mail Order: Local Ordering Provider: David Would the patient rather a call back or a response via [facility]? Call back Best Call Back Number: [phone] Information: Patient stated he would like prescription sent to pharmacy in advance in advance for pickup on 05/25/2025 Please Assist

## 2025-05-22 ENCOUNTER — TELEPHONE (OUTPATIENT)
Dept: INTERNAL MEDICINE | Facility: CLINIC | Age: 60
End: 2025-05-22
Payer: MEDICARE

## 2025-05-22 DIAGNOSIS — E11.9 TYPE 2 DIABETES MELLITUS WITHOUT COMPLICATION: ICD-10-CM

## 2025-05-22 DIAGNOSIS — E11.9 TYPE 2 DIABETES MELLITUS WITHOUT COMPLICATION, UNSPECIFIED WHETHER LONG TERM INSULIN USE: ICD-10-CM

## 2025-05-22 NOTE — TELEPHONE ENCOUNTER
Patient stated he has a copayment that he can't really afford and was wondering if we had anything to assist with reducing co payment. Patient informed we dont have anything but I will reach out to [facility] to see if they have any available programs for patient. Patient voiced understanding.  ----- Message from Mitchell sent at 5/22/2025 12:16 PM CDT -----  Contact: Pt  [phone]  .[email or web address] Patient is calling for Medical Advice regarding:Pt has a concern about Dex com G7 he would to speak with staffHow long has patient had these symptoms:N/APharmacy name and phone#:[facility] Phone: [phone]: [phone] wants a call back or thru [facility]:CallbackComments:Please advise patient replies from provider may take up to 48 hours.

## 2025-05-27 ENCOUNTER — OFFICE VISIT (OUTPATIENT)
Facility: CLINIC | Age: 60
End: 2025-05-27
Payer: MEDICARE

## 2025-05-27 VITALS
DIASTOLIC BLOOD PRESSURE: 78 MMHG | BODY MASS INDEX: 22.55 KG/M2 | SYSTOLIC BLOOD PRESSURE: 129 MMHG | HEIGHT: 71 IN | WEIGHT: 161.06 LBS | HEART RATE: 96 BPM

## 2025-05-27 DIAGNOSIS — I69.398 SPASTICITY AS LATE EFFECT OF CEREBROVASCULAR ACCIDENT (CVA): ICD-10-CM

## 2025-05-27 DIAGNOSIS — G89.29 OTHER CHRONIC PAIN: ICD-10-CM

## 2025-05-27 DIAGNOSIS — G81.94 LEFT HEMIPARESIS: Primary | ICD-10-CM

## 2025-05-27 DIAGNOSIS — R25.2 SPASTICITY AS LATE EFFECT OF CEREBROVASCULAR ACCIDENT (CVA): ICD-10-CM

## 2025-05-27 PROCEDURE — 1160F RVW MEDS BY RX/DR IN RCRD: CPT | Mod: CPTII,S$GLB,, | Performed by: NEUROLOGICAL SURGERY

## 2025-05-27 PROCEDURE — 99999 PR PBB SHADOW E&M-EST. PATIENT-LVL III: CPT | Mod: PBBFAC,,, | Performed by: NEUROLOGICAL SURGERY

## 2025-05-27 PROCEDURE — 3008F BODY MASS INDEX DOCD: CPT | Mod: CPTII,S$GLB,, | Performed by: NEUROLOGICAL SURGERY

## 2025-05-27 PROCEDURE — 99204 OFFICE O/P NEW MOD 45 MIN: CPT | Mod: S$GLB,,, | Performed by: NEUROLOGICAL SURGERY

## 2025-05-27 PROCEDURE — 3078F DIAST BP <80 MM HG: CPT | Mod: CPTII,S$GLB,, | Performed by: NEUROLOGICAL SURGERY

## 2025-05-27 PROCEDURE — 3074F SYST BP LT 130 MM HG: CPT | Mod: CPTII,S$GLB,, | Performed by: NEUROLOGICAL SURGERY

## 2025-05-27 PROCEDURE — 1159F MED LIST DOCD IN RCRD: CPT | Mod: CPTII,S$GLB,, | Performed by: NEUROLOGICAL SURGERY

## 2025-05-27 PROCEDURE — 3052F HG A1C>EQUAL 8.0%<EQUAL 9.0%: CPT | Mod: CPTII,S$GLB,, | Performed by: NEUROLOGICAL SURGERY

## 2025-05-27 RX ORDER — DIAZEPAM 5 MG/1
5 TABLET ORAL 3 TIMES DAILY PRN
Qty: 90 TABLET | Refills: 1 | Status: SHIPPED | OUTPATIENT
Start: 2025-05-27

## 2025-05-27 RX ORDER — TRAMADOL HYDROCHLORIDE 50 MG/1
100 TABLET, FILM COATED ORAL EVERY 6 HOURS PRN
Qty: 240 TABLET | Refills: 1 | Status: SHIPPED | OUTPATIENT
Start: 2025-05-27

## 2025-05-27 RX ORDER — BACLOFEN 10 MG/1
30 TABLET ORAL 3 TIMES DAILY
Qty: 270 TABLET | Refills: 3 | Status: SHIPPED | OUTPATIENT
Start: 2025-05-27

## 2025-05-27 NOTE — PROGRESS NOTES
Name: Salty Goode  MRN: [id number]   CSN: [id number]      Date: 05/27/2025      History of Present Illness    CHIEF COMPLAINT:  Mr. Goode presents today for follow up of left-sided spastic paresis.  He was previously seen by me in my former practice, at which time I felt that he would be better served under the management of a spasticity specialist and/or pain management specialist, but this has not occurred.    SPASTICITY AND PAIN:  He reports increased spasticity and significant pain in the left paraspinal musculature in the thoracic and lumbar regions due to hypertrophy.    MEDICAL HISTORY:  He has history of right hemispheric CVA.    MEDICATIONS:  He discontinued baclofen (previously 30 mg TID). He was also taking valium 5 mg TID and tramadol 50 mg 1-2 tablets q6h PRN for pain. He reports difficulty filling medications at pharmacy due to medication combination restrictions.      ROS:  General: -fever, -chills, -fatigue, -weight gain, -weight loss  Eyes: -vision changes, -redness, -discharge  ENT: -ear pain, -nasal congestion, -sore throat  Cardiovascular: -chest pain, -palpitations, -lower extremity edema  Respiratory: -cough, -shortness of breath  Gastrointestinal: -abdominal pain, -nausea, -vomiting, -diarrhea, -constipation, -blood in stool  Genitourinary: -dysuria, -hematuria, -frequency  Musculoskeletal: -joint pain, -muscle pain  Skin: -rash, -lesion  Neurological: -headache, -dizziness, -numbness, -tingling, +muscle spasms  Psychiatric: -anxiety, -depression, -sleep difficulty              Past Medical History: The patient  has a past medical history of Cardiomegaly, Cigarette smoker (04/09/2015), Diabetes mellitus, Hyperlipidemia LDL goal <70 (05/13/2025), Hypertension, and Thyroid mass (04/09/2015).    Social History: The patient  reports that he has quit smoking. His smoking use included cigarettes. He does not have any smokeless tobacco history on file.    Family History: Their family history is not on  "file.    Allergies: Patient has no known allergies.     Meds: Scheduled Meds:  Continuous Infusions:  PRN Meds:.    Exam:  Physical Exam    General: No acute distress. Well-developed. Well-nourished.  Eyes: EOMI. Sclerae anicteric.  HENT: Normocephalic. Atraumatic. Nares patent. Moist oral mucosa.  Ears: Bilateral TMs clear. Bilateral EACs clear.  Cardiovascular: Regular rate. Regular rhythm. No murmurs. No rubs. No gallops. Normal S1, S2.  Respiratory: Normal respiratory effort. Clear to auscultation bilaterally. No rales. No rhonchi. No wheezing.  Abdomen: Soft. Non-tender. Non-distended. Normoactive bowel sounds.  Musculoskeletal: No  obvious deformity. Lumbar paraspinal muscle tenderness (Left). Thoracic paraspinal muscle tenderness (Left).  Extremities: No lower extremity edema.  Neurological: Alert & oriented x3. No slurred speech. Normal gait. Spastic left-sided paresis, incomplete. Partial usage of left side.  Psychiatric: Normal mood. Normal affect. Good insight. Good judgment.  Skin: Warm. Dry. No rash.          /78   Pulse 96   Ht 5' 11" (1.803 m)   Wt 73.1 kg (161 lb 0.7 oz)   BMI 22.46 kg/m²     Constitutional  Well-developed, well-nourished, appears stated age   Ophthalmoscopic  No papilledema with no hemorrhages or exudates bilaterally   Cardiovascular  Radial pulses 2+ and symmetric, no LE edema bilaterally   Neurological    * Mental status      - Orientation  Oriented to person, place, time, and situation     - Memory   Intact recent and remote     - Attention/concentration  Attentive, vigilant during exam     - Language  Naming & repetition intact, +2-step commands     - Fund of knowledge  Aware of current events     - Executive  Well-organized thoughts     - Other     * Cranial nerves       - CN II  PERRL, visual fields full to confrontation     - CN III, IV, VI  Extraocular movements full, normal pursuits and saccades     - CN V  Sensation V1 - V3 intact     - CN VII  Face strong and " symmetric bilaterally     - CN VIII  Hearing intact bilaterally     - CN IX, X  Palate raises midline and symmetric     - CN XI  SCM and trapezius 5/5 bilaterally     - CN XII  Tongue midline   * Motor  Spastic left hemiparesis, incomplete   * Sensory   Intact to temperature and vibration throughout   * Coordination  No dysmetria with finger-to-nose or heel-to-shin   * Gait  Rigid but does not require assistance device   * Deep tendon reflexes  2+ on the right 3+ on the left   Babinski downgoing bilaterally     Laboratory/Radiological:Reviewed  - Results:  Office Visit on 05/13/2025   Component Date Value Ref Range Status    POC Glucose 05/13/2025 160 (A)  70 - 110 MG/DL Final       - Independent review of images:        Assessment & Plan    IMPRESSION:  - Lost to follow-up since December, experiencing increased spasticity after stopping baclofen.  - Demonstrates spastic left-sided paresis with partial usage of left side.  - Significant hypertrophy in left paraspinal musculature (thorax and lumbar) causing pain.  - Considered Botox as potential future treatment, but patient not motivated to pursue.  - Determined pain management would be most suitable for addressing spasticity and chronic pain.    SPASTICITY:  - Baclofen 30 mg TID.  - Valium 5 mg TID.    CHRONIC PAIN:  - Tramadol 50 mg 1-2 PO Q6 hours PRN for pain.    FOLLOW-UP:  - Referred to pain management for spasticity and chronic pain management.    PLAN SUMMARY:  - Tramadol 50 mg 1-2 PO Q6 hours PRN for pain  - Baclofen 30 mg TID  - Valium 5 mg TID  - Referral to pain management for spasticity and chronic pain management          Risks/benefits, potential side effects of medications, and alternative therapies discussed as appropriate.    This note was generated with the assistance of ambient listening technology. Verbal consent was obtained by the patient and accompanying visitor(s) for the recording of patient appointment to facilitate this note. I attest to  having reviewed and edited the generated note for accuracy, though some syntax or spelling errors may persist. Please contact the author of this note for any clarification.       YEIMI Hernandez M.D.

## 2025-06-04 DIAGNOSIS — G89.29 OTHER CHRONIC PAIN: ICD-10-CM

## 2025-06-05 RX ORDER — DIAZEPAM 5 MG/1
5 TABLET ORAL 3 TIMES DAILY PRN
Qty: 90 TABLET | Refills: 1 | Status: SHIPPED | OUTPATIENT
Start: 2025-06-05

## 2025-06-10 ENCOUNTER — PATIENT OUTREACH (OUTPATIENT)
Dept: ADMINISTRATIVE | Facility: HOSPITAL | Age: 60
End: 2025-06-10
Payer: MEDICARE

## 2025-06-10 DIAGNOSIS — E11.9 TYPE 2 DIABETES MELLITUS WITHOUT COMPLICATION, UNSPECIFIED WHETHER LONG TERM INSULIN USE: Primary | ICD-10-CM

## 2025-06-10 NOTE — PROGRESS NOTES
HM updated with urine micro and 2025 colonoscopy  A1c ordered- spoke to pt, scheduled lab appt  Will schedule eyecam at a later date    HM, care everywhere, immunizations updated  Chart review complete

## 2025-06-13 ENCOUNTER — OFFICE VISIT (OUTPATIENT)
Dept: PAIN MEDICINE | Facility: CLINIC | Age: 60
End: 2025-06-13
Payer: MEDICARE

## 2025-06-13 VITALS
TEMPERATURE: 98 F | HEART RATE: 80 BPM | RESPIRATION RATE: 18 BRPM | OXYGEN SATURATION: 100 % | BODY MASS INDEX: 22.84 KG/M2 | SYSTOLIC BLOOD PRESSURE: 137 MMHG | WEIGHT: 163.13 LBS | HEIGHT: 71 IN | DIASTOLIC BLOOD PRESSURE: 80 MMHG

## 2025-06-13 DIAGNOSIS — F11.90 CHRONIC, CONTINUOUS USE OF OPIOIDS: Primary | ICD-10-CM

## 2025-06-13 DIAGNOSIS — R25.2 SPASTICITY AS LATE EFFECT OF CEREBROVASCULAR ACCIDENT (CVA): ICD-10-CM

## 2025-06-13 DIAGNOSIS — I69.398 SPASTICITY AS LATE EFFECT OF CEREBROVASCULAR ACCIDENT (CVA): ICD-10-CM

## 2025-06-13 PROCEDURE — 99999 PR PBB SHADOW E&M-EST. PATIENT-LVL IV: CPT | Mod: PBBFAC,,, | Performed by: ANESTHESIOLOGY

## 2025-06-13 NOTE — PROGRESS NOTES
PCP: Olive Greer NP    REFERRING PHYSICIAN: Yonatan Hernandez Jr., MD    CHIEF COMPLAINT: left sided spasticity and pain    Original HISTORY OF PRESENT ILLNESS: Salty Goode presents to the clinic for the evaluation of the above pain. 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 He was referred by Neurologist Dr. Hernandez for management of his pain. The pain started three months after his stroke.     Original Pain Description:  The pain is located to his left side specifically in the L shoulder, L flank, left lower back, posterior left leg and calf. The pain is described as aching, sharp, shooting, and throbbing. Exacerbating factors: constant pain. Mitigating factors medications and physical therapy. Symptoms interfere with daily activity. The patient feels like symptoms have been unchanged. Patient denies night fever/night sweats, urinary incontinence, bowel incontinence, and significant weight loss.    Original PAIN SCORES:  Best: Pain is 6  Worst: Pain is 10  Current: Pain is 9        6/13/2025     2:51 PM   Last 3 PDI Scores   Pain Disability Index (PDI) 53       INTERVAL HISTORY: (Newest visit at the bottom)   Interval History (Date):       6 weeks of Conservative therapy:  PT: February 2025-Present   Chiro: none   HEP: Participating       Treatments / Medications: (Ice/Heat/NSAIDS/APAP/etc):  Tramadol 100 mg QID   Baclofen 30 mg TID   Valium 5 mg TID (no longer taking)      Interventional Pain Procedures: (Previous injections)  None     Past Medical History:   Diagnosis Date    Cardiomegaly     Cigarette smoker 04/09/2015    Diabetes mellitus     Hyperlipidemia LDL goal <70 05/13/2025    Hypertension     Thyroid mass 04/09/2015     Past Surgical History:   Procedure Laterality Date    COLONOSCOPY N/A 8/29/2018    Procedure: COLONOSCOPY;  Surgeon: Elsi Lake MD;  Location: [facility] ([address] [place] [address]);  Service: Endoscopy;  Laterality: N/A;  Referral received  from Dr. JB Bradley,[facility].  Ok to hold Plavix 5 days prior per Dr. Bradley.     Social History[1]  No family history on file.    Review of patient's allergies indicates:  No Known Allergies    Current Outpatient Medications   Medication Sig    amLODIPine (NORVASC) 10 MG tablet Take 1 tablet (10 mg total) by mouth once daily.    aspirin (ECOTRIN) 325 MG EC tablet Take 1 tablet (325 mg total) by mouth once daily.    atorvastatin (LIPITOR) 40 MG tablet Take 1 tablet (40 mg total) by mouth once daily.    baclofen (LIORESAL) 10 MG tablet Take 3 tablets (30 mg total) by mouth 3 (three) times daily.    clopidogreL (PLAVIX) 75 mg tablet Take 1 tablet (75 mg total) by mouth once daily.    diazePAM (VALIUM) 5 MG tablet Take 1 tablet (5 mg total) by mouth 3 (three) times daily as needed for Anxiety.    dulaglutide (TRULICITY) 3 mg/0.5 mL pen injector Inject 3 mg into the skin every 7 days.    empagliflozin (JARDIANCE) 25 mg tablet Take 1 tablet (25 mg total) by mouth once daily.    ferrous sulfate (FEOSOL) 325 mg (65 mg iron) Tab tablet Take by mouth.    insulin aspart U-100 (NOVOLOG FLEXPEN U-100 INSULIN) 100 unit/mL (3 mL) InPn pen Sliding scale insulin: with meals up to 3 times daily.  slindg scale 0-150= 0; 151-200 = 2 units; 201-250= 4 units; 251-300= 6 units; 301-350= 8 units; 351 or greater = 10 units. Total daily dose 35 units.    losartan-hydrochlorothiazide 100-25 mg (HYZAAR) 100-25 mg per tablet Take 1 tablet by mouth once daily.    traMADoL (ULTRAM) 50 mg tablet Take 2 tablets (100 mg total) by mouth every 6 (six) hours as needed for Pain.    vitamin D (VITAMIN D3) 1000 units Tab Take 1 tablet by mouth once daily.     No current facility-administered medications for this visit.       ROS:  GENERAL: No fever. No chills. No fatigue. Denies weight loss. Denies weight gain.  HEENT: Denies headaches. Denies vision change. Denies eye pain. Denies double vision. Denies ear pain.   CV: Denies chest pain.  "  PULM: Denies of shortness of breath.  GI: Denies constipation. No diarrhea. No abdominal pain. Denies nausea. Denies vomiting. No blood in stool.  HEME: Denies bleeding problems.  : Denies urgency. No painful urination. No blood in urine.  MS: Denies joint stiffness. Denies joint swelling.  Denies back pain.  SKIN: Denies rash.   NEURO: Denies seizures. No weakness.  PSYCH:  Denies difficulty sleeping. No anxiety. Denies depression. No suicidal thoughts.       VITALS:   Vitals:    06/13/25 1450   BP: 137/80   Pulse: 80   Resp: 18   Temp: 98 °F (36.7 °C)   TempSrc: Oral   SpO2: 100%   Weight: 74 kg (163 lb 2.3 oz)   Height: 5' 11" (1.803 m)   PainSc:   9         PHYSICAL EXAM:   GENERAL: Well appearing, in no acute distress, alert and oriented x3.  PSYCH:  Mood and affect appropriate.  SKIN: Skin color, texture, turgor normal, no rashes or lesions.  HEENT:  Normocephalic, atraumatic. Cranial nerves grossly intact.  NECK: No pain to palpation over the cervical paraspinous muscles. No pain to palpation over facets. Limited ROM with flexion and extension, positive Spurling on L side.   PULM: No evidence of respiratory difficulty, symmetric chest rise.  GI:  Non-distended  BACK: Limited ROM to flexion, extension, and lateral rotation 2/2 pain. +bilateral facet loading. Tender to palpation over left lumbar paraspinals, L facets, and L lower thoracic paraspinals.. No pain to palpation over the spinous processes. There is no pain with palpation over the sacroiliac joints bilaterally.   EXTREMITIES: No deformities, edema, or skin discoloration.   MUSCULOSKELETAL: Hip and knee provocative maneuvers are negative. No atrophy is noted. L shoulder limited ROM, tender to palpation over posterior L shoulder and L lateral arm. L flank tender to palpation. Muscle spasticity through L flank and Left upper extremity.   NEURO: Sensation is equal and appropriate bilaterally. Bilateral upper and lower extremity strength is normal and " "symmetric, except for L side 4/5 throuhgout. Bilateral upper and lower extremity coordination and muscle stretch reflexes are physiologic and symmetric. Plantar response are downgoing. Straight leg raising in the supine position is +radicular pain on L side.   GAIT: Antalgic.      LABS:      IMAGING:        ASSESSMENT: 59 y.o. year old male with pain, consistent with:    Encounter Diagnoses   Name Primary?    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 He has been treated with opioids and benzodiazepines only since 2024. On presentation he says he was hoping we would put him on "Oxycodone 10 mg." Exam shows good range of motion of the extremities but decreased fine motor skills and pain reproduced with palpation over his L side, as well as positive Spurling and SLR.     OPIOID MANAGEMENT:  MME: 81 + Diazepam 5 TID  Risk: History of ETOH abuse  : Reviewed today  Utox: None    PLAN:  Discussed that opioids would not be my recommended treatment for this condition  We offered to do a workup to find alternative treatment for him, but he declines  He states he would rather take Oxycodone   Follow up as needed      I would like to thank Yonatan Hernandez Jr., MD for the opportunity to assist in the care of this patient. We had a very nice visit and I look forward to continuing their care. Please let me know if I can be of further assistance.     Carmelina Orellana  06/13/2025           [1]   Social History  Socioeconomic History    Marital status:    Tobacco Use    Smoking status: Former     Current packs/day: 1.00     Types: Cigarettes     Social Drivers of Health     Financial Resource Strain: Low Risk  (6/24/2024)    Received from [facility]    Overall Financial Resource Strain (CARDIA)     Difficulty of Paying Living " Expenses: Not very hard   Food Insecurity: No Food Insecurity (6/24/2024)    Received from [facility]    Hunger Vital Sign     Worried About Running Out of Food in the Last Year: Never true     Ran Out of Food in the Last Year: Never true   Transportation Needs: No Transportation Needs (6/24/2024)    Received from [facility]    PRAPARE - Transportation     Lack of Transportation (Medical): No     Lack of Transportation (Non-Medical): No   Physical Activity: Insufficiently Active (6/24/2024)    Received from [facility]    Exercise Vital Sign     Days of Exercise per Week: 1 day     Minutes of Exercise per Session: 20 min   Stress: No Stress Concern Present (6/24/2024)    Received from [facility]    [employer] - Occupational Stress Questionnaire     Feeling of Stress : Not at all   Housing Stability: Low Risk  (6/24/2024)    Received from [facility]    Housing Stability Vital Sign     Unable to Pay for Housing in the Last Year: No     Number of Places Lived in the Last Year: 1     Unstable Housing in the Last Year: No

## 2025-06-17 ENCOUNTER — LAB VISIT (OUTPATIENT)
Dept: LAB | Facility: HOSPITAL | Age: 60
End: 2025-06-17
Payer: MEDICARE

## 2025-06-17 DIAGNOSIS — E11.9 TYPE 2 DIABETES MELLITUS WITHOUT COMPLICATION, UNSPECIFIED WHETHER LONG TERM INSULIN USE: ICD-10-CM

## 2025-06-17 PROCEDURE — 83036 HEMOGLOBIN GLYCOSYLATED A1C: CPT

## 2025-06-17 PROCEDURE — 36415 COLL VENOUS BLD VENIPUNCTURE: CPT | Mod: PN

## 2025-06-18 LAB
EAG (OHS): 169 MG/DL (ref 68–131)
HBA1C MFR BLD: 7.5 % (ref 4–5.6)

## 2025-06-19 ENCOUNTER — TELEPHONE (OUTPATIENT)
Dept: INTERNAL MEDICINE | Facility: CLINIC | Age: 60
End: 2025-06-19
Payer: MEDICARE

## 2025-06-19 DIAGNOSIS — E11.9 TYPE 2 DIABETES MELLITUS WITHOUT COMPLICATION, WITHOUT LONG-TERM CURRENT USE OF INSULIN: Primary | ICD-10-CM

## 2025-06-19 NOTE — TELEPHONE ENCOUNTER
Patient called for scheduling with diabetic provider GLORIA Esparza. Patient scheduled soonest available, patient voiced understanding of appointment time and date.

## 2025-06-27 ENCOUNTER — TELEPHONE (OUTPATIENT)
Dept: PODIATRY | Facility: CLINIC | Age: 60
End: 2025-06-27
Payer: MEDICARE

## 2025-06-27 NOTE — TELEPHONE ENCOUNTER
Spoke with pt in regards to r/s due to provider chaning location. Pt requests to stay in [place]. Pt r/s and verbalized understanding.

## 2025-07-02 ENCOUNTER — TELEPHONE (OUTPATIENT)
Facility: CLINIC | Age: 60
End: 2025-07-02
Payer: MEDICARE

## 2025-07-02 NOTE — TELEPHONE ENCOUNTER
Copied from CRM #[id number]. Topic: Medications - Medication Refill  >> Jul 2, 2025 10:47 AM Ketty wrote:  Type:  RX Refill Request    Who Called: pt  Refill or New Rx:refill  RX Name and Strength:traMADoL (ULTRAM) 50 mg tablet  Preferred Pharmacy with phone number:[facility][id number] [address]  Local or Mail Order:local  Ordering Provider:toan  Would the patient rather a call back or a response via [facility]? call  Best Call Back Number:[phone]  Additional Information: nisa he has been calling and waiting for refill wanting to get before 4th of July

## 2025-08-21 ENCOUNTER — TELEPHONE (OUTPATIENT)
Dept: INTERNAL MEDICINE | Facility: CLINIC | Age: 60
End: 2025-08-21
Payer: MEDICARE

## 2025-08-22 ENCOUNTER — OFFICE VISIT (OUTPATIENT)
Dept: INTERNAL MEDICINE | Facility: CLINIC | Age: 60
End: 2025-08-22
Payer: MEDICARE

## 2025-08-22 VITALS
SYSTOLIC BLOOD PRESSURE: 124 MMHG | WEIGHT: 158.5 LBS | BODY MASS INDEX: 22.19 KG/M2 | OXYGEN SATURATION: 99 % | HEART RATE: 93 BPM | HEIGHT: 71 IN | DIASTOLIC BLOOD PRESSURE: 70 MMHG

## 2025-08-22 DIAGNOSIS — E11.9 TYPE 2 DIABETES MELLITUS WITHOUT COMPLICATION, WITHOUT LONG-TERM CURRENT USE OF INSULIN: Primary | ICD-10-CM

## 2025-08-22 DIAGNOSIS — E78.5 HYPERLIPIDEMIA LDL GOAL <70: ICD-10-CM

## 2025-08-22 LAB — GLUCOSE SERPL-MCNC: 95 MG/DL (ref 70–110)

## 2025-08-22 PROCEDURE — 99999 PR PBB SHADOW E&M-EST. PATIENT-LVL IV: CPT | Mod: PBBFAC,,,
